# Patient Record
(demographics unavailable — no encounter records)

---

## 2024-11-10 NOTE — ASSESSMENT
[FreeTextEntry1] : 48-year-old lady history of anxiety, hypertension, GERD, hypercholesterolemia, hyperprolactinemia, migraines (on weekly med), carpal tunnel b/l, isolated elevated calpro telemed to f/u. She actually forgot about appointment, felt well o new sx but did find last colon and it's >10 y ago so she is due for colon cancer screening.

## 2024-11-10 NOTE — HISTORY OF PRESENT ILLNESS
[FreeTextEntry1] : Visit type: Telephonic (phone only) Patient Location: [place], [place] Provider Location: [address] [place] [address] Consent obtained for visit:Yes Visit length: 30 minutes Others present: No   48-year-old lady history of anxiety, hypertension, GERD, hypercholesterolemia, hyperprolactinemia, migraines (on weekly med), carpal tunnel b/l, status post ER visit in [place] for severe abdominal pain here for follow-up. Calpro elevated. AUS with fatty liver only.  Plan at last telemed Sept 2024: 1. Since feeling well and not sure re when due for a colonoscopy, will watch calpro for now, though elevated. 2. Asked her to contact prior GI for colon/path details from last procedure 3. f/u in 3 mo 4. encouraged use of the portal  found paper for last colon 2013 stool fine, no current sx 3 d dark

## 2024-11-10 NOTE — HISTORY OF PRESENT ILLNESS
[FreeTextEntry1] : Visit type: Telephonic (phone only) Patient Location: [place], [place] Provider Location: [address] [place] [address] Consent obtained for visit:Yes Visit length: 30 minutes Others present: No   48-year-old lady history of anxiety, hypertension, GERD, hypercholesterolemia, hyperprolactinemia, migraines (on weekly med), carpal tunnel b/l, status post ER visit in [place] for severe abdominal pain here for follow-up. Calpro elevated. AUS with fatty liver only.  Plan at last telemed Sept 2024: 1. Since feeling well and not sure re when due for a colonoscopy, will watch calpro for now, though elevated. 2. Asked her to contact prior GI for colon/path details from last procedure 3. f/u in 3 mo 4. encouraged use of the portal  found paper for last colon 2013 stool fine, no current sx 3 d dark

## 2024-11-10 NOTE — PLAN
[TextEntry] : Colonoscopy. Indication - colon cancer screening. In light of prev elevated calpro, TI bx would be a good idea.   The procedure(s) was (were) discussed in detail with the patient, including indications, alternatives and limitations.  The risks and benefits were reviewed.  If applicable, the prep directions were reviewed as well, else the patient was told to fast on the day of the procedure.

## 2025-04-11 NOTE — DISCUSSION/SUMMARY
[de-identified] : I examined, evaluated, and discussed with the patient. The patient has low back symptoms for 3 months. No clear radicular leg pain.   Based on physical exam and image findings, the patient diagnosis is mild degenerative disc disease at L5-S1.   Treatment plan is medications PRN and PT and home exercise.   The patient understands everything and all questions were answered. The patient will return in 2 months.

## 2025-04-11 NOTE — HISTORY OF PRESENT ILLNESS
[de-identified] : The patient is 48 years old female who has low back pain for 3 months. No clear radicular leg pain. No clear cause of the symptoms. She is working as a .   Pain Level:  2 sometimes goes up to 10 Duration of Symptoms:  3 months Symptom course:  Getting worse Accident:  No   Previous Treatment:    Pain meds:  Yes    Physical therapy:  No    Epidural steroid injection:  No

## 2025-04-11 NOTE — PHYSICAL EXAM
[de-identified] : The patient is alert, cooperative, and oriented x 3. Pupils are equal and round. The patient does not have skin rash.   Gait is normal. Back ROM is within normal range. Tenderness at PVM. SLR negative. DTR normal range. Motor and sensory are basically normal throughout bilateral L/E. Circulation of legs is normal. Bladder and bowel functions are normal. [de-identified] : Lumbar spine x-ray taken recently at [facility] shows mild degenerative disc disease at L5-S1.

## 2025-04-11 NOTE — CONSULT LETTER
[Dear  ___] : Dear  [unfilled], [Consult Letter:] : I had the pleasure of evaluating your patient, [unfilled]. [Please see my note below.] : Please see my note below. [Consult Closing:] : Thank you very much for allowing me to participate in the care of this patient.  If you have any questions, please do not hesitate to contact me. [Sincerely,] : Sincerely, [FreeTextEntry3] : Jesse Ruiz MD PhD

## 2025-04-30 NOTE — HISTORY OF PRESENT ILLNESS
[Patient declined mammogram] : Patient declined mammogram [Patient declined breast sonogram] : Patient declined breast sonogram [Patient reported PAP Smear was abnormal] : Patient reported PAP Smear was abnormal [Men] : men [Yes] : Yes [TextBox_4] : Annual GYN, no current report of vaginal odor, discharge or pelvic pain Requesting Pap and STD testing, requesting prolactin level, was taking treatment with Cabergoline for prolactinemia and nipple discharge Hx supracervical hysterectomy for fibroids Hx hyperlipidemia, HTN Mammogram 8/24/25 [facility] 2 Pelvic US 5/10/24 resolution of ovarian cysts [PapSmeardate] : 11/14/2023 [TextBox_6] : 14 yrs ago

## 2025-04-30 NOTE — PHYSICAL EXAM
[MA] : MA [FreeTextEntry2] : [place] [Appropriately responsive] : appropriately responsive [Soft] : soft [Oriented x3] : oriented x3 [Examination Of The Breasts] : a normal appearance [No Masses] : no breast masses were palpable [Labia Majora] : normal [Labia Minora] : normal [Normal] : normal

## 2025-06-02 NOTE — HISTORY OF PRESENT ILLNESS
[de-identified] : 48 year old female patient with history of stable Essential Hypertension, Neuralgia, Hypercholesterolemia, Hyperprolactinemia, Abnormal LFTs, GERD without esophagitis, Vitamin D Deficiency, Pituitary Microadenoma, history as stated, presented for an annual preventative examination.

## 2025-06-02 NOTE — HISTORY OF PRESENT ILLNESS
[de-identified] : 48 year old female patient with history of stable Essential Hypertension, Neuralgia, Hypercholesterolemia, Hyperprolactinemia, Abnormal LFTs, GERD without esophagitis, Vitamin D Deficiency, Pituitary Microadenoma, history as stated, presented for an annual preventative examination.

## 2025-06-02 NOTE — HEALTH RISK ASSESSMENT
[No] : In the past 12 months have you used drugs other than those required for medical reasons? No [No falls in past year] : Patient reported no falls in the past year [0] : 2) Feeling down, depressed, or hopeless: Not at all (0) [Never] : Never [NO] : No [Patient reported mammogram was normal] : Patient reported mammogram was normal [Patient reported PAP Smear was normal] : Patient reported PAP Smear was normal [Feels Safe at Home] : Feels safe at home [Fully functional (bathing, dressing, toileting, transferring, walking, feeding)] : Fully functional (bathing, dressing, toileting, transferring, walking, feeding) [Fully functional (using the telephone, shopping, preparing meals, housekeeping, doing laundry, using] : Fully functional and needs no help or supervision to perform IADLs (using the telephone, shopping, preparing meals, housekeeping, doing laundry, using transportation, managing medications and managing finances) [Smoke Detector] : smoke detector [Carbon Monoxide Detector] : carbon monoxide detector [Seat Belt] :  uses seat belt [Sunscreen] : uses sunscreen [With Patient/Caregiver] : , with patient/caregiver [de-identified] : GI/ORTHO/GYN [[id number]] : 0 [Change in mental status noted] : No change in mental status noted [Reports changes in hearing] : Reports no changes in hearing [Reports changes in vision] : Reports no changes in vision [Reports changes in dental health] : Reports no changes in dental health [MammogramDate] : 08/24 [PapSmearDate] : 11/23 [HIVDate] : 04/24 [HIVComments] : Negative [HepatitisCDate] : 08/24 [HepatitisCComments] : Negative [AdvancecareDate] : 06/25

## 2025-06-02 NOTE — HEALTH RISK ASSESSMENT
[No] : In the past 12 months have you used drugs other than those required for medical reasons? No [No falls in past year] : Patient reported no falls in the past year [0] : 2) Feeling down, depressed, or hopeless: Not at all (0) [Never] : Never [NO] : No [Patient reported mammogram was normal] : Patient reported mammogram was normal [Patient reported PAP Smear was normal] : Patient reported PAP Smear was normal [Feels Safe at Home] : Feels safe at home [Fully functional (bathing, dressing, toileting, transferring, walking, feeding)] : Fully functional (bathing, dressing, toileting, transferring, walking, feeding) [Fully functional (using the telephone, shopping, preparing meals, housekeeping, doing laundry, using] : Fully functional and needs no help or supervision to perform IADLs (using the telephone, shopping, preparing meals, housekeeping, doing laundry, using transportation, managing medications and managing finances) [Smoke Detector] : smoke detector [Carbon Monoxide Detector] : carbon monoxide detector [Seat Belt] :  uses seat belt [Sunscreen] : uses sunscreen [With Patient/Caregiver] : , with patient/caregiver [de-identified] : GI/ORTHO/GYN [[id number]] : 0 [Change in mental status noted] : No change in mental status noted [Reports changes in hearing] : Reports no changes in hearing [Reports changes in vision] : Reports no changes in vision [Reports changes in dental health] : Reports no changes in dental health [MammogramDate] : 08/24 [PapSmearDate] : 11/23 [HIVDate] : 04/24 [HIVComments] : Negative [HepatitisCDate] : 08/24 [HepatitisCComments] : Negative [AdvancecareDate] : 06/25

## 2025-06-02 NOTE — HEALTH RISK ASSESSMENT
[No] : In the past 12 months have you used drugs other than those required for medical reasons? No [No falls in past year] : Patient reported no falls in the past year [0] : 2) Feeling down, depressed, or hopeless: Not at all (0) [Never] : Never [NO] : No [Patient reported mammogram was normal] : Patient reported mammogram was normal [Patient reported PAP Smear was normal] : Patient reported PAP Smear was normal [Feels Safe at Home] : Feels safe at home [Fully functional (bathing, dressing, toileting, transferring, walking, feeding)] : Fully functional (bathing, dressing, toileting, transferring, walking, feeding) [Fully functional (using the telephone, shopping, preparing meals, housekeeping, doing laundry, using] : Fully functional and needs no help or supervision to perform IADLs (using the telephone, shopping, preparing meals, housekeeping, doing laundry, using transportation, managing medications and managing finances) [Smoke Detector] : smoke detector [Carbon Monoxide Detector] : carbon monoxide detector [Seat Belt] :  uses seat belt [Sunscreen] : uses sunscreen [With Patient/Caregiver] : , with patient/caregiver [de-identified] : GI/ORTHO/GYN [[id number]] : 0 [Change in mental status noted] : No change in mental status noted [Reports changes in hearing] : Reports no changes in hearing [Reports changes in vision] : Reports no changes in vision [Reports changes in dental health] : Reports no changes in dental health [MammogramDate] : 08/24 [PapSmearDate] : 11/23 [HIVDate] : 04/24 [HIVComments] : Negative [HepatitisCDate] : 08/24 [HepatitisCComments] : Negative [AdvancecareDate] : 06/25

## 2025-06-02 NOTE — HEALTH RISK ASSESSMENT
[No] : In the past 12 months have you used drugs other than those required for medical reasons? No [No falls in past year] : Patient reported no falls in the past year [0] : 2) Feeling down, depressed, or hopeless: Not at all (0) [Never] : Never [NO] : No [Patient reported mammogram was normal] : Patient reported mammogram was normal [Patient reported PAP Smear was normal] : Patient reported PAP Smear was normal [Feels Safe at Home] : Feels safe at home [Fully functional (bathing, dressing, toileting, transferring, walking, feeding)] : Fully functional (bathing, dressing, toileting, transferring, walking, feeding) [Fully functional (using the telephone, shopping, preparing meals, housekeeping, doing laundry, using] : Fully functional and needs no help or supervision to perform IADLs (using the telephone, shopping, preparing meals, housekeeping, doing laundry, using transportation, managing medications and managing finances) [Smoke Detector] : smoke detector [Carbon Monoxide Detector] : carbon monoxide detector [Seat Belt] :  uses seat belt [Sunscreen] : uses sunscreen [With Patient/Caregiver] : , with patient/caregiver [de-identified] : GI/ORTHO/GYN [[id number]] : 0 [Change in mental status noted] : No change in mental status noted [Reports changes in hearing] : Reports no changes in hearing [Reports changes in vision] : Reports no changes in vision [Reports changes in dental health] : Reports no changes in dental health [MammogramDate] : 08/24 [PapSmearDate] : 11/23 [HIVDate] : 04/24 [HIVComments] : Negative [HepatitisCDate] : 08/24 [HepatitisCComments] : Negative [AdvancecareDate] : 06/25

## 2025-06-02 NOTE — HISTORY OF PRESENT ILLNESS
[de-identified] : 48 year old female patient with history of stable Essential Hypertension, Neuralgia, Hypercholesterolemia, Hyperprolactinemia, Abnormal LFTs, GERD without esophagitis, Vitamin D Deficiency, Pituitary Microadenoma, history as stated, presented for an annual preventative examination.

## 2025-06-02 NOTE — ASSESSMENT
[Vaccines Reviewed] : Immunizations reviewed today. Please see immunization details in the vaccine log within the immunization flowsheet.  [FreeTextEntry1] : 48 year old female found to have stable Essential Hypertension, Neuralgia, Hypercholesterolemia, Hyperprolactinemia, Abnormal LFTs, GERD without esophagitis, Vitamin D Deficiency, Pituitary Microadenoma, with the current prescription regimen as recommended, diet and lifestyle modifications, as counseled. Prior results reviewed, interpreted and discussed with the patient during today's examination, as appropriate. Follow up, treatment plan and tests, as ordered.   Patient was recommended to follow up with GYN for routine examination, PAP smear and Mammogram, reports will be provided, when ready.

## 2025-06-02 NOTE — HISTORY OF PRESENT ILLNESS
[de-identified] : 48 year old female patient with history of stable Essential Hypertension, Neuralgia, Hypercholesterolemia, Hyperprolactinemia, Abnormal LFTs, GERD without esophagitis, Vitamin D Deficiency, Pituitary Microadenoma, history as stated, presented for an annual preventative examination.

## 2025-06-05 NOTE — PHYSICAL EXAM
Attempted to call patient for his Nurse/MA telephone appointment, to schedule his Colonoscopy. LVM: for patient to call back.   [MA] : MA [FreeTextEntry2] : [place] [Appropriately responsive] : appropriately responsive [Soft] : soft [Oriented x3] : oriented x3 [Examination Of The Breasts] : a normal appearance [No Masses] : no breast masses were palpable [Labia Majora] : normal [Labia Minora] : normal [Normal] : normal

## 2025-07-02 NOTE — DISCUSSION/SUMMARY
[de-identified] : CATALINA LEHMAN is a 48 year old Left-hand-dominant female presenting today for insidious onset 3-month history of left-sided wrist and thumb pain consistent with first dorsal compartment tenosynovitis.  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  Plan: 1.  Diclofenac 75 mg twice daily x 1 week then as needed for the next 3 to 4 weeks as needed 2.  Voltaren gel up to 4 times a day as needed also prescribed 3.  Patient will use low-profile thumb spica brace purchased over-the-counter 4.  Patient will follow-up in 2 months if no improvement can consider occupational therapy versus CSI.

## 2025-07-02 NOTE — HISTORY OF PRESENT ILLNESS
[de-identified] : CATALINA LEHMAN is a 48 year old female presenting with 3-month history of insidious onset left-sided dorsal wrist and thumb pain.  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  Denies any specific trauma or injury to the area.  She states the pain is significant and has been causing issues with her sleeping.  Here today for further evaluation.  Of note patient also has a history of carpal tunnel syndrome however this feels different denies any worsening numbness tingling burning.

## 2025-07-02 NOTE — PHYSICAL EXAM
[de-identified] : Hand/wrist/finger (left)  Inspection  Swelling: none  Ecchymosis: none  Scissoring: none    Palpation  Tenderness: Moderate-significant Location: First dorsal compartment  Wrist Flexion  Normal.  Wrist Extension  Normal.  Wrist Radial Deviation  Normal.  Wrist Ulnar Deviation  Normal.   Wrist/Pinch/ Motor Strength  Wrist Extension: 5/5 Wrist Flexion: 5/5  : 5/5   Finger Flexion  Normal.  Finger Extension  Normal.  Sensory index  Normal   Special Tests   Finkelsteins: Positive Tinnels: normal  Phalens: Positive Pinch : normal